# Patient Record
Sex: FEMALE | Race: OTHER | HISPANIC OR LATINO | URBAN - METROPOLITAN AREA
[De-identification: names, ages, dates, MRNs, and addresses within clinical notes are randomized per-mention and may not be internally consistent; named-entity substitution may affect disease eponyms.]

---

## 2020-12-23 ENCOUNTER — EMERGENCY (EMERGENCY)
Facility: HOSPITAL | Age: 20
LOS: 1 days | Discharge: HOME | End: 2020-12-23
Attending: EMERGENCY MEDICINE
Payer: MEDICAID

## 2020-12-23 VITALS
OXYGEN SATURATION: 97 % | SYSTOLIC BLOOD PRESSURE: 116 MMHG | HEIGHT: 65 IN | DIASTOLIC BLOOD PRESSURE: 70 MMHG | WEIGHT: 147.71 LBS | TEMPERATURE: 98 F | RESPIRATION RATE: 20 BRPM | HEART RATE: 108 BPM

## 2020-12-23 VITALS
TEMPERATURE: 98 F | OXYGEN SATURATION: 98 % | HEART RATE: 99 BPM | RESPIRATION RATE: 18 BRPM | DIASTOLIC BLOOD PRESSURE: 72 MMHG | SYSTOLIC BLOOD PRESSURE: 121 MMHG

## 2020-12-23 LAB
HCG UR QL: NEGATIVE — SIGNIFICANT CHANGE UP
RAPID RVP RESULT: SIGNIFICANT CHANGE UP
S PYO DNA THROAT QL NAA+PROBE: SIGNIFICANT CHANGE UP
SARS-COV-2 RNA SPEC QL NAA+PROBE: SIGNIFICANT CHANGE UP

## 2020-12-23 PROCEDURE — 87798 DETECT AGENT NOS DNA AMP: CPT

## 2020-12-23 PROCEDURE — 87651 STREP A DNA AMP PROBE: CPT

## 2020-12-23 PROCEDURE — 99283 EMERGENCY DEPT VISIT LOW MDM: CPT

## 2020-12-23 PROCEDURE — 99283 EMERGENCY DEPT VISIT LOW MDM: CPT | Mod: 25

## 2020-12-23 PROCEDURE — 0225U NFCT DS DNA&RNA 21 SARSCOV2: CPT

## 2020-12-23 PROCEDURE — 96372 THER/PROPH/DIAG INJ SC/IM: CPT | Mod: XU

## 2020-12-23 PROCEDURE — 81025 URINE PREGNANCY TEST: CPT

## 2020-12-23 RX ORDER — IBUPROFEN 200 MG
1 TABLET ORAL
Qty: 30 | Refills: 0
Start: 2020-12-23

## 2020-12-23 RX ORDER — PENICILLIN G BENZATHINE 1200000 [IU]/2ML
1.2 INJECTION, SUSPENSION INTRAMUSCULAR ONCE
Refills: 0 | Status: COMPLETED | OUTPATIENT
Start: 2020-12-23 | End: 2020-12-23

## 2020-12-23 RX ORDER — IBUPROFEN 200 MG
800 TABLET ORAL ONCE
Refills: 0 | Status: COMPLETED | OUTPATIENT
Start: 2020-12-23 | End: 2020-12-23

## 2020-12-23 RX ORDER — DEXAMETHASONE 0.5 MG/5ML
10 ELIXIR ORAL ONCE
Refills: 0 | Status: COMPLETED | OUTPATIENT
Start: 2020-12-23 | End: 2020-12-23

## 2020-12-23 RX ADMIN — Medication 10 MILLIGRAM(S): at 02:27

## 2020-12-23 RX ADMIN — Medication 800 MILLIGRAM(S): at 01:51

## 2020-12-23 RX ADMIN — Medication 800 MILLIGRAM(S): at 02:27

## 2020-12-23 RX ADMIN — PENICILLIN G BENZATHINE 1.2 MILLION UNIT(S): 1200000 INJECTION, SUSPENSION INTRAMUSCULAR at 05:15

## 2020-12-23 NOTE — ED ADULT NURSE NOTE - OBJECTIVE STATEMENT
pt compliant sore throat and fever that Sunday night, that is painful when swallow and it feels like 2 balls are stuck in her throat

## 2020-12-23 NOTE — ED PROVIDER NOTE - NSFOLLOWUPINSTRUCTIONS_ED_ALL_ED_FT
Strep Throat    WHAT YOU NEED TO KNOW:    Strep throat is a throat infection caused by bacteria. It is easily spread from person to person.    DISCHARGE INSTRUCTIONS:    Call 911 for any of the following:   •You have trouble breathing.          Return to the emergency department if:   •You have new symptoms like a bad headache, stiff neck, chest pain, or vomiting.      •You are drooling because you cannot swallow your spit.      Contact your healthcare provider if:   •You have a fever.      •You have a rash or ear pain.      •You have green, yellow-brown, or bloody mucus when you cough or blow your nose.      •You are unable to drink anything.      •You have questions or concerns about your condition or care.      Medicines:   •Antibiotics help treat your strep throat. You should feel better within 2 to 3 days after you start antibiotics.      •Take your medicine as directed. Contact your healthcare provider if you think your medicine is not helping or if you have side effects. Tell him or her if you are allergic to any medicine. Keep a list of the medicines, vitamins, and herbs you take. Include the amounts, and when and why you take them. Bring the list or the pill bottles to follow-up visits. Carry your medicine list with you in case of an emergency.      Manage your symptoms:   •Use lozenges, ice, soft foods, or popsicles to soothe your throat.      •Drink juice, milk shakes, or soup if your throat is too sore to eat solid food. Drinking liquids can also help prevent dehydration.      •Gargle with salt water. Mix ¼ teaspoon salt in a glass of warm water and gargle. This may help reduce swelling in your throat.      •Do not smoke. Nicotine and other chemicals in cigarettes and cigars can cause lung damage and make your symptoms worse. Ask your healthcare provider for information if you currently smoke and need help to quit. E-cigarettes or smokeless tobacco still contain nicotine. Talk to your healthcare provider before you use these products.       Return to work or school 24 hours after you start antibiotic medicine.    Prevent the spread of strep throat:   •Wash your hands often. Use soap and water. Wash your hands after you use the bathroom, change a child's diapers, or sneeze. Wash your hands before you prepare or eat food.       •Do not share food or drinks. Replace your toothbrush after you have taken antibiotics for 24 hours.      Follow up with your healthcare provider as directed: Write down your questions so you remember to ask them during your visits.        © Copyright [employer] 2020

## 2020-12-23 NOTE — ED PROVIDER NOTE - CLINICAL SUMMARY MEDICAL DECISION MAKING FREE TEXT BOX
Pt w/ aforementioned presentation w/ strep pharyngitis airway patent, no clinical signs of deep space throat infection or ludwigs, treated w/ bicillin, improved after meds, stable for dc home.

## 2020-12-23 NOTE — ED ADULT TRIAGE NOTE - CHIEF COMPLAINT QUOTE
pt compliant sore throat and fever that Sunday night, that is painful when swallow and it feels like 2 balls are stuck in her throat.

## 2020-12-23 NOTE — ED PROVIDER NOTE - OBJECTIVE STATEMENT
19 y/o female with no significant pmhx or pshx, comes in c/o sore throat x 2 days. Pt reports progressive sore throat with associated pain on swallowing and subjective fever. 19 y/o female with no significant pmhx or pshx, comes in c/o sore throat x 2 days. Pt reports progressive sore throat with associated pain on swallowing and subjective fever. Pt denies cough or dyspnea.

## 2020-12-23 NOTE — ED PROVIDER NOTE - PATIENT PORTAL LINK FT
You can access the FollowMyHealth Patient Portal offered by [facility] by registering at the following website: [email or web address]. By joining [employer]’s FollowMyHealth portal, you will also be able to view your health information using other applications (apps) compatible with our system.

## 2020-12-23 NOTE — ED PROVIDER NOTE - PHYSICAL EXAMINATION
General: pt lying in stretcher, appears stated age and is not in distress  HEENT: AT/NC, pink conjunctiva, anicteric sclerae, EOMI, PERRLA, TMs smooth, grey, intact, with normal landmarks, nasal mucosa pink, no discharge, turbinates not enlarged; moist mucus membranes, tongue well-papillated, good dentition; posterior pharynx shows no erythema or exudates;   Neck: supple, full ROM, trachea midline, no JVD, no cervical LAD, no midline ttp or stepoffs  Lungs: symmetric excursion, b/l clear vesicular breath sounds with no wheezes, crackles, or rhonchi  Heart: rrr, S1, S2 normal; no S3 or S4; no murmurs or rubs  Abd: normal bowel sounds; soft, nontender; negative McBurney's point tenderness, negative Tavares's sign, no rebound, no guarding, spleen non-palpable; no hepatomegaly, no masses  Back: no midline spinal tenderness or stepoffs, no costovertebral angle tenderness  Extremities: no clubbing, cyanosis, or edema; no palpable deformities or fractures  Skin: good turgor; no rashes, petechiae, ecchymoses, or jaundice  Pulses: radial, posterior tibialis (PT), dorsalis pedis (DP) all 2+ & symmetric  Neuro: awake, alert, responsive; oriented to person, place and time; cranial nerves intact, EOMI, intact jaw movement, intact facial sensation, no facial asymmetry, hearing intact; no nystagmus, tongue midline; Motor: Normal tone in upper and lower extremities bilaterally strength 5/5; Sensory: intact to pinprick and light touch; Cerebellar: finger-to-nose intact; normal steady gait; negative Romberg’s sign; DTR: biceps, triceps, patellar, 2+, no pronator drift General: pt lying in stretcher, appears stated age and is not in distress, speaking in full sentences  HEENT: AT/NC, pink conjunctiva, anicteric sclerae, EOMI, PERRLA, nasal mucosa pink, no discharge, turbinates not enlarged; moist mucus membranes, tongue well-papillated, good dentition; posterior pharynx shows 2+ tonsils, +exudates, uvula midline, +erythema  Neck: supple, full ROM, trachea midline, no JVD, +left cervical LAD, no midline ttp or stepoffs  Lungs: symmetric excursion, b/l clear vesicular breath sounds with no wheezes, crackles, or rhonchi  Heart: rrr, S1, S2 normal; no S3 or S4; no murmurs or rubs  Abd: normal bowel sounds; soft, nontender;   Extremities: no clubbing, cyanosis, or edema; no palpable deformities or fractures  Skin: good turgor; no rashes, petechiae, ecchymoses, or jaundice  Pulses: radial, posterior tibialis (PT), dorsalis pedis (DP) all 2+ & symmetric  Neuro: awake, alert, responsive; oriented to person, place and time; cranial nerves intact, EOMI, intact jaw movement, intact facial sensation, no facial asymmetry, hearing intact; no nystagmus, tongue midline; Motor: Normal tone in upper and lower extremities bilaterally ; Sensory: intact ; normal steady gait;

## 2020-12-23 NOTE — ED PROVIDER NOTE - NSFOLLOWUPCLINICS_GEN_ALL_ED_FT
[facility]  [address] [place] [address]  Phone: [phone]  Fax: [phone]  Follow Up Time:

## 2023-05-18 NOTE — ED ADULT TRIAGE NOTE - WEIGHT METHOD
soft, mild left upper quadrant tenderness to palpation, normal active bowel sounds, no guarding or rigidity
actual

## 2024-07-25 ENCOUNTER — APPOINTMENT (OUTPATIENT)
Dept: ORTHOPEDIC SURGERY | Facility: CLINIC | Age: 24
End: 2024-07-25
Payer: COMMERCIAL

## 2024-07-25 ENCOUNTER — RESULT CHARGE (OUTPATIENT)
Age: 24
End: 2024-07-25

## 2024-07-25 VITALS — BODY MASS INDEX: 24.49 KG/M2 | WEIGHT: 147 LBS | HEIGHT: 65 IN

## 2024-07-25 DIAGNOSIS — M65.9 SYNOVITIS AND TENOSYNOVITIS, UNSPECIFIED: ICD-10-CM

## 2024-07-25 DIAGNOSIS — S93.492A SPRAIN OF OTHER LIGAMENT OF LEFT ANKLE, INITIAL ENCOUNTER: ICD-10-CM

## 2024-07-25 DIAGNOSIS — Z78.9 OTHER SPECIFIED HEALTH STATUS: ICD-10-CM

## 2024-07-25 PROCEDURE — 73610 X-RAY EXAM OF ANKLE: CPT | Mod: LT

## 2024-07-25 PROCEDURE — 99204 OFFICE O/P NEW MOD 45 MIN: CPT

## 2024-07-25 RX ORDER — MELOXICAM 7.5 MG/1
7.5 TABLET ORAL
Qty: 30 | Refills: 0 | Status: ACTIVE | COMMUNITY
Start: 2024-07-25 | End: 1900-01-01

## 2024-07-25 NOTE — IMAGING
[de-identified] : Patient ambulates with a Antalgic gait   Left Foot and Ankle: Inspection: Erythema: None Swelling: Diffuse swelling laterally, anteriorly, medially Ecchymosis: None Abrasions: None Rashes: None Surgical Scars: None Effusion: None Atrophy: None Deformity: None Pes [place] Valgus: Negative Pes Cavus: Negative Hallux Valgus: Negative Clawtoe/Hammertoe: Negative  Palpation: Crepitus: None Proximal Fibula: Nontender Distal Fibula: Nontender Medial Malleolus: tender Lateral Malleolus: tender AITFL: tender PITFL: Nontender ATFL: tender CFL: tender Deltoid: tender Calcaneus: Nontender Talar Head/Neck: Nontender Lateral Process of Talus: Nontender Anterior Facet of Calcaneus: Nontender Peroneal Tendons: tender Posterior Tibialis: Nontender Achilles Tendon: Nontender & Intact Achilles Insertion: Nontender Retrocalcaneal Bursa: Nontender Anterior Capsule: tender Subtalar Joint: Nontender Talonavicular Joint: Nontender Calcaneocuboid Joint: Nontender Heel pad: Nontender Medial Tubercle of Calcaneus: Nontender Plantar Fascia: Nontender Midfoot: Nontender Forefoot: Nontender Sesamoids: Nontender  ROM: Ankle Dorsiflexion: 0 degrees Ankle Plantar Flexion: 35 degrees Eversion: 15 degrees Inversion: 25 degrees, painful  Motor: Dorsiflexion: 5 out of 5 Plantar Flexion: 5 out of 5 Inversion: 5  out of 5 Eversion: 5 out of 5, painful  Provocative Testing: Anterior Drawer: Positive Syndesmosis Squeeze Test: Negative Circumduction test: Negative Resisted ER: Painful  Axial Grind 1st MTP: Painless Neurologic Exam: L4-S1 Sensation: Grossly Intact Tinels: Negative  Vascular Exam/Pulses: Dorsalis Pedis: 2+ Posterior Tibialis: 2+ Capillary Refill: <2 Seconds Other Exams: None Pertinent Contralateral Findings: None

## 2024-07-25 NOTE — DISCUSSION/SUMMARY
[de-identified] : Discussed with patient the pathomechanics and pathophysiology of this condition.  Discussed with patient that I utilize a ladder analogy when determining treatment plans for ankle sprains.  Lowest rung on the ladder and easiest thing to do involves a trial of cam boot immobilization for 10 to 14 days, anti-inflammatories in the form of Mobic (a prescription was provided), weightbearing as tolerated, compression sock (information provided), and a plantar fascia night splint (information provided).  We discussed the risks and benefits associated with nonsteroidal anti-inflammatories.  Those risks include bleeding, kidney, GI issues.  Patient was instructed to take the medications with food.  And she was instructed to discontinue them should any abdominal or stomach discomfort occur.  I will see her back at 2 weeks time.  At that point we will reevaluate her degree of instability and initiate a functional rehabilitation protocol.  She will continue to maintain sleeping in the night splint for approximately 6 weeks in total.  Additionally she will begin on range of motion, edema control no passive or active inversion is to occur for at least 6 weeks.  Should her symptoms fail to improve or worsen by 6 weeks we will consider getting an advanced imaging in the form of MRI to ensure that we are not missing any other concurrent soft tissue pathology or chondral injuries that could be contributing to her symptomatology.  I am optimistic based on her history and physical examination that she will be able to heal this without requiring surgical fixation.  In the event that surgery is needed we discussed that appropriate surgical treatment would be based upon MRI findings, which would include either ligament repair versus reconstruction and potentially arthroscopic surgery, limited versus extensive debridement and addressing any chondral lesions that are present and/or any other associated pathology.  All questions were asked and answered.  Patient is in agreement with the plan.  She will follow-up accordingly.   The patient's current medication management of their orthopedic diagnosis was reviewed today: (1) We discussed a comprehensive treatment plan that included possible pharmaceutical management involving the use of prescription strength medications including but not limited to options such as oral Naprosyn 500mg BID, once daily Meloxicam 15 mg, or 500-650 mg Tylenol versus over the counter oral medications and topical prescription NSAID vs over the counter Voltaren gel. (2) There is a moderate risk of morbidity with further treatment, especially from use of prescription strength medications and possible side effects of these medications which include upset stomach with oral medications, skin reactions to topical medications and cardiac/renal issues with long term use. (3) I recommended that the patient follow-up with their medical physician to discuss any significant specific potential issues with long term medication use such as interactions with current medications or with exacerbation of underlying medical comorbidities. (4) The benefits and risks associated with use of injectable, oral or topical, prescription and over the counter anti-inflammatory medications were discussed with the patient. The patient voiced understanding of the risks including but not limited to bleeding, stroke, kidney dysfunction, heart disease, and were referred to the black box warning label for further information.     Plan for next visit: 1.  Assess ATFL CFL assess anterior drawer assess swelling, consider transition to ASO and PT with restriction of inversion active and passive for the next 4 weeks. 2. pt may want to see for shoulder in future

## 2024-08-16 ENCOUNTER — APPOINTMENT (OUTPATIENT)
Dept: ORTHOPEDIC SURGERY | Facility: CLINIC | Age: 24
End: 2024-08-16
Payer: COMMERCIAL

## 2024-08-16 DIAGNOSIS — S93.492A SPRAIN OF OTHER LIGAMENT OF LEFT ANKLE, INITIAL ENCOUNTER: ICD-10-CM

## 2024-08-16 DIAGNOSIS — M65.9 SYNOVITIS AND TENOSYNOVITIS, UNSPECIFIED: ICD-10-CM

## 2024-08-16 PROCEDURE — 99204 OFFICE O/P NEW MOD 45 MIN: CPT

## 2024-08-16 NOTE — DISCUSSION/SUMMARY
[de-identified] : Discussed with patient the pathomechanics and pathophysiology of this condition.  Discussed with patient that I utilize a ladder analogy when determining treatment plans for ankle sprains.  Lowest rung on the ladder and easiest thing to do involves a trial of cam boot immobilization for 10 to 14 days, anti-inflammatories in the form of Mobic (a prescription was provided), weightbearing as tolerated, compression sock (information provided), and a plantar fascia night splint (information provided).  We discussed the risks and benefits associated with nonsteroidal anti-inflammatories.  Those risks include bleeding, kidney, GI issues.  Patient was instructed to take the medications with food.  And she was instructed to discontinue them should any abdominal or stomach discomfort occur.  I will see her back at 2 weeks time.  At that point we will reevaluate her degree of instability and initiate a functional rehabilitation protocol.  She will continue to maintain sleeping in the night splint for approximately 6 weeks in total.  Additionally she will begin on range of motion, edema control no passive or active inversion is to occur for at least 6 weeks.  Should her symptoms fail to improve or worsen by 6 weeks we will consider getting an advanced imaging in the form of MRI to ensure that we are not missing any other concurrent soft tissue pathology or chondral injuries that could be contributing to her symptomatology.  I am optimistic based on her history and physical examination that she will be able to heal this without requiring surgical fixation.  In the event that surgery is needed we discussed that appropriate surgical treatment would be based upon MRI findings, which would include either ligament repair versus reconstruction and potentially arthroscopic surgery, limited versus extensive debridement and addressing any chondral lesions that are present and/or any other associated pathology.  All questions were asked and answered.  Patient is in agreement with the plan.  She will follow-up accordingly.   The patient's current medication management of their orthopedic diagnosis was reviewed today: (1) We discussed a comprehensive treatment plan that included possible pharmaceutical management involving the use of prescription strength medications including but not limited to options such as oral Naprosyn 500mg BID, once daily Meloxicam 15 mg, or 500-650 mg Tylenol versus over the counter oral medications and topical prescription NSAID vs over the counter Voltaren gel. (2) There is a moderate risk of morbidity with further treatment, especially from use of prescription strength medications and possible side effects of these medications which include upset stomach with oral medications, skin reactions to topical medications and cardiac/renal issues with long term use. (3) I recommended that the patient follow-up with their medical physician to discuss any significant specific potential issues with long term medication use such as interactions with current medications or with exacerbation of underlying medical comorbidities. (4) The benefits and risks associated with use of injectable, oral or topical, prescription and over the counter anti-inflammatory medications were discussed with the patient. The patient voiced understanding of the risks including but not limited to bleeding, stroke, kidney dysfunction, heart disease, and were referred to the black box warning label for further information.     Plan for next visit: 1.  Assess ATFL CFL assess anterior drawer assess swelling, consider transition to ASO and PT with restriction of inversion active and passive for the next 4 weeks.  *** Patient seen and examined.  Patient presents today following up for low ankle sprain, high ankle sprain, deltoid sprain.  they have been compliant with treatment protocol thus far including cam boot immobilization, plantar fascia night splint, compression socks, nonsteroidal anti-inflammatories.  On examination there is significant improvement in swelling and exam.  There is still a Lachman however it is improved compared to prior examination.  At this juncture I discussed with patient that they are able to progress to the next phase of recovery which involves transitioning out of the cam boot into an ASO with a compression sock. Plan to continue use the night splint for another 2.5 months, should they not be able to tolerate the night splint, they can sleep in the boot with a pillowcase around the boot. They are to be in the ASO at all times.  Additionally physical therapy has been ordered.  They have been given a printout of my functional ankle rehabilitation.  Additionally they have been educated on the restriction of no active or passive inversion for another 4 weeks.  I will plan to see them back in approximately 4 weeks time.  Should they continue to progress appropriately we will remove the inversion restriction and discuss return to sport activity and final stages of physical therapy.  Should there be any persistent pain or plateauing improvement we will obtain an MRI and discuss potential injection to help get them over the plateau.  Should patient go on to have persistent instability we will discuss risk and benefits of surgical intervention.  All questions asked and answered.  Patient will follow-up with me in 4 weeks.     Plan for next visit: 1.  Assess tenderness at ATFL CFL, deltoid, assess drawer, assess for subluxation of peroneal tendon, discuss advancing physical therapy and removing inversion restriction if improving.  If patient is not improving consider MR 2. pt may want to see for shoulder in future

## 2024-08-16 NOTE — HISTORY OF PRESENT ILLNESS
[de-identified] : 7/25/24: Date of Injury/Onset: 2021 Pain: At Rest: 2 With Activity: 7 Mechanism of injury:  This is NOT a Work Related Injury being treated under Worker's Compensation. This is NOT an athletic injury occurring associated with an interscholastic or organized sports team. Quality of symptoms: throbbing and aching pain  Improves with:  Worse with: walking, stairs and standing  Prior treatment: ankle brace  Prior Imaging: no Reports Available For Review Today: no Out of work/sport: working School/Sport/Position/Occupation: [employer]  07/25/2024 BOBBY 23 year F is here today for evaluation of left ankle. Patient reports injury 3 years ago she fell and sprained her ankle. Patient went to see her PCP wore a brace but never followed up. Patient denies n/t however endorses some throbbing and aching pain. Patient notes pain is radiating up her knee. Patient have not been taking pain meds. Patient notes pain is worse with walking, stairs and standing  8/16/24: Patient here to follow up on left ankle. Patient reports pain has improved, denies swelling. Wearing cam boot all day. Feels some discomfort and instability when taking boot off.

## 2024-08-16 NOTE — IMAGING
[de-identified] : Patient ambulates with a normal gait   Left Foot and Ankle: Inspection: Erythema: None Swelling: no swelling laterally, anteriorly, medially Ecchymosis: None Abrasions: None Rashes: None Surgical Scars: None Effusion: None Atrophy: None Deformity: None Pes [place] Valgus: Negative Pes Cavus: Negative Hallux Valgus: Negative Clawtoe/Hammertoe: Negative  Palpation: Crepitus: None Proximal Fibula: Nontender Distal Fibula: Nontender Medial Malleolus: nontender Lateral Malleolus: nontender AITFL: nontender PITFL: Nontender ATFL: nontender CFL: nontender Deltoid: tender Calcaneus: Nontender Talar Head/Neck: Nontender Lateral Process of Talus: Nontender Anterior Facet of Calcaneus: Nontender Peroneal Tendons: nontender Posterior Tibialis: Nontender Achilles Tendon: Nontender & Intact Achilles Insertion: Nontender Retrocalcaneal Bursa: Nontender Anterior Capsule: nontender Subtalar Joint: Nontender Talonavicular Joint: Nontender Calcaneocuboid Joint: Nontender Heel pad: Nontender Medial Tubercle of Calcaneus: Nontender Plantar Fascia: Nontender Midfoot: Nontender Forefoot: Nontender Sesamoids: Nontender  ROM: Ankle Dorsiflexion: 0 degrees Ankle Plantar Flexion: 35 degrees Eversion: 15 degrees Inversion: 25 degrees, painful  Motor: Dorsiflexion: 5 out of 5 Plantar Flexion: 5 out of 5 Inversion: 5  out of 5 Eversion: 5 out of 5, painless  Provocative Testing: Anterior Drawer: neg Syndesmosis Squeeze Test: Negative Circumduction test: Negative Resisted ER: Painless  Axial Grind 1st MTP: Painless Neurologic Exam: L4-S1 Sensation: Grossly Intact Tinels: Negative  Vascular Exam/Pulses: Dorsalis Pedis: 2+ Posterior Tibialis: 2+ Capillary Refill: <2 Seconds Other Exams: None Pertinent Contralateral Findings: None

## 2024-09-12 ENCOUNTER — APPOINTMENT (OUTPATIENT)
Dept: ORTHOPEDIC SURGERY | Facility: CLINIC | Age: 24
End: 2024-09-12
Payer: COMMERCIAL

## 2024-09-12 DIAGNOSIS — S93.492A SPRAIN OF OTHER LIGAMENT OF LEFT ANKLE, INITIAL ENCOUNTER: ICD-10-CM

## 2024-09-12 DIAGNOSIS — M65.9 SYNOVITIS AND TENOSYNOVITIS, UNSPECIFIED: ICD-10-CM

## 2024-09-12 DIAGNOSIS — Z00.00 ENCOUNTER FOR GENERAL ADULT MEDICAL EXAMINATION W/OUT ABNORMAL FINDINGS: ICD-10-CM

## 2024-09-12 DIAGNOSIS — G25.89 OTHER SPECIFIED EXTRAPYRAMIDAL AND MOVEMENT DISORDERS: ICD-10-CM

## 2024-09-12 PROCEDURE — 73030 X-RAY EXAM OF SHOULDER: CPT | Mod: LT

## 2024-09-12 PROCEDURE — 99214 OFFICE O/P EST MOD 30 MIN: CPT

## 2024-09-12 PROCEDURE — 73010 X-RAY EXAM OF SHOULDER BLADE: CPT | Mod: LT

## 2024-09-12 NOTE — IMAGING
[de-identified] : Patient ambulates with a normal gait   Left Foot and Ankle: Inspection: Erythema: None Swelling: no swelling laterally, anteriorly, medially Ecchymosis: None Abrasions: None Rashes: None Surgical Scars: None Effusion: None Atrophy: None Deformity: None Pes Tripoli Valgus: Negative Pes Cavus: Negative Hallux Valgus: Negative Clawtoe/Hammertoe: Negative  Palpation: Crepitus: None Proximal Fibula: Nontender Distal Fibula: Nontender Medial Malleolus: nontender Lateral Malleolus: nontender AITFL: nontender PITFL: Nontender ATFL: nontender CFL: nontender Deltoid: tender Calcaneus: Nontender Talar Head/Neck: Nontender Lateral Process of Talus: Nontender Anterior Facet of Calcaneus: Nontender Peroneal Tendons: nontender Posterior Tibialis: Nontender Achilles Tendon: Nontender & Intact Achilles Insertion: Nontender Retrocalcaneal Bursa: Nontender Anterior Capsule: nontender Subtalar Joint: Nontender Talonavicular Joint: Nontender Calcaneocuboid Joint: Nontender Heel pad: Nontender Medial Tubercle of Calcaneus: Nontender Plantar Fascia: Nontender Midfoot: Nontender Forefoot: Nontender Sesamoids: Nontender  ROM: Ankle Dorsiflexion: 0 degrees Ankle Plantar Flexion: 35 degrees Eversion: 15 degrees Inversion: 25 degrees, painful  Motor: Dorsiflexion: 5 out of 5 Plantar Flexion: 5 out of 5 Inversion: 5  out of 5 Eversion: 5 out of 5, painless  Provocative Testing: Anterior Drawer: neg Syndesmosis Squeeze Test: Negative Circumduction test: Negative Resisted ER: Painless  Axial Grind 1st MTP: Painless Neurologic Exam: L4-S1 Sensation: Grossly Intact Tinels: Negative  Vascular Exam/Pulses: Dorsalis Pedis: 2+ Posterior Tibialis: 2+ Capillary Refill: <2 Seconds Other Exams: None Pertinent Contralateral Findings: None   LEFT SHOULDER  Inspection: No swelling.   Palpation: Tenderness is noted at the bicipital groove, anterior and lateral.   Range of motion: There is pain with range of motion.  , ER 55, @90ER 90, @90IR 30  Strength: There is mild pain and discomfort with strength testing.  Forward Flexion 4/5. Abduction 4/5.  External Rotation 5-/5 and Internal Rotation 5/5   Neurological testings: motor and sensor intact distally.  Ligament Stability and Special Tests:   There is positive arc of pain.   Shoulder apprehension: neg  Shoulder relocation: neg  Obriens test: pos  Biceps Active test: neg  Nath Labral Shear: neg  Impingement testing: pos  Raul testing: pos  Whipple: pos  Cross Body Adduction: neg

## 2024-09-12 NOTE — DATA REVIEWED
[FreeTextEntry1] : 3 v l ankle  neg for fx or dislocation  Left X-Ray Examination of the SHOULDER 2 views:  no fractures, subluxations or dislocations.   X-Ray Examination of the SCAPULA 1 or 2 views shows: there is an acromial spur

## 2024-09-12 NOTE — DISCUSSION/SUMMARY
[de-identified] : Discussed with patient the pathomechanics and pathophysiology of this condition.  Discussed with patient that I utilize a ladder analogy when determining treatment plans for ankle sprains.  Lowest rung on the ladder and easiest thing to do involves a trial of cam boot immobilization for 10 to 14 days, anti-inflammatories in the form of Mobic (a prescription was provided), weightbearing as tolerated, compression sock (information provided), and a plantar fascia night splint (information provided).  We discussed the risks and benefits associated with nonsteroidal anti-inflammatories.  Those risks include bleeding, kidney, GI issues.  Patient was instructed to take the medications with food.  And she was instructed to discontinue them should any abdominal or stomach discomfort occur.  I will see her back at 2 weeks time.  At that point we will reevaluate her degree of instability and initiate a functional rehabilitation protocol.  She will continue to maintain sleeping in the night splint for approximately 6 weeks in total.  Additionally she will begin on range of motion, edema control no passive or active inversion is to occur for at least 6 weeks.  Should her symptoms fail to improve or worsen by 6 weeks we will consider getting an advanced imaging in the form of MRI to ensure that we are not missing any other concurrent soft tissue pathology or chondral injuries that could be contributing to her symptomatology.  I am optimistic based on her history and physical examination that she will be able to heal this without requiring surgical fixation.  In the event that surgery is needed we discussed that appropriate surgical treatment would be based upon MRI findings, which would include either ligament repair versus reconstruction and potentially arthroscopic surgery, limited versus extensive debridement and addressing any chondral lesions that are present and/or any other associated pathology.  All questions were asked and answered.  Patient is in agreement with the plan.  She will follow-up accordingly.   The patient's current medication management of their orthopedic diagnosis was reviewed today: (1) We discussed a comprehensive treatment plan that included possible pharmaceutical management involving the use of prescription strength medications including but not limited to options such as oral Naprosyn 500mg BID, once daily Meloxicam 15 mg, or 500-650 mg Tylenol versus over the counter oral medications and topical prescription NSAID vs over the counter Voltaren gel. (2) There is a moderate risk of morbidity with further treatment, especially from use of prescription strength medications and possible side effects of these medications which include upset stomach with oral medications, skin reactions to topical medications and cardiac/renal issues with long term use. (3) I recommended that the patient follow-up with their medical physician to discuss any significant specific potential issues with long term medication use such as interactions with current medications or with exacerbation of underlying medical comorbidities. (4) The benefits and risks associated with use of injectable, oral or topical, prescription and over the counter anti-inflammatory medications were discussed with the patient. The patient voiced understanding of the risks including but not limited to bleeding, stroke, kidney dysfunction, heart disease, and were referred to the black box warning label for further information.     Plan for next visit: 1.  Assess ATFL CFL assess anterior drawer assess swelling, consider transition to ASO and PT with restriction of inversion active and passive for the next 4 weeks.  *** Patient seen and examined.  Patient presents today following up for low ankle sprain, high ankle sprain, deltoid sprain.  they have been compliant with treatment protocol thus far including cam boot immobilization, plantar fascia night splint, compression socks, nonsteroidal anti-inflammatories.  On examination there is significant improvement in swelling and exam.  There is still a Lachman however it is improved compared to prior examination.  At this juncture I discussed with patient that they are able to progress to the next phase of recovery which involves transitioning out of the cam boot into an ASO with a compression sock. Plan to continue use the night splint for another 2.5 months, should they not be able to tolerate the night splint, they can sleep in the boot with a pillowcase around the boot. They are to be in the ASO at all times.  Additionally physical therapy has been ordered.  They have been given a printout of my functional ankle rehabilitation.  Additionally they have been educated on the restriction of no active or passive inversion for another 4 weeks.  I will plan to see them back in approximately 4 weeks time.  Should they continue to progress appropriately we will remove the inversion restriction and discuss return to sport activity and final stages of physical therapy.  Should there be any persistent pain or plateauing improvement we will obtain an MRI and discuss potential injection to help get them over the plateau.  Should patient go on to have persistent instability we will discuss risk and benefits of surgical intervention.  All questions asked and answered.  Patient will follow-up with me in 4 weeks.     Plan for next visit: 1.  Assess tenderness at ATFL CFL, deltoid, assess drawer, assess for subluxation of peroneal tendon, discuss advancing physical therapy and removing inversion restriction if improving.  If patient is not improving consider MR 2. pt may want to see for shoulder in future **** L ankle sprain improving with ASO and PT continue PT new PT rx provided gradual return to impact exercises L shoulder appears trapezial tenderness without any radicular signs with associated scapular dyskinesis ReC: HEP   next visit: RTS L ankle if improved, if not improved MR If ankle is okay, start PT for L shoulder

## 2024-09-12 NOTE — HISTORY OF PRESENT ILLNESS
[de-identified] : 7/25/24: Date of Injury/Onset: 2021 Pain: At Rest: 2 With Activity: 7 Mechanism of injury:  This is NOT a Work Related Injury being treated under Worker's Compensation. This is NOT an athletic injury occurring associated with an interscholastic or organized sports team. Quality of symptoms: throbbing and aching pain  Improves with:  Worse with: walking, stairs and standing  Prior treatment: ankle brace  Prior Imaging: no Reports Available For Review Today: no Out of work/sport: working School/Sport/Position/Occupation: [employer]  07/25/2024 BOBBY Sol[age] year F is here today for evaluation of left ankle. Patient reports injury 3 years ago she fell and sprained her ankle. Patient went to see her PCP wore a brace but never followed up. Patient denies n/t however endorses some throbbing and aching pain. Patient notes pain is radiating up her knee. Patient have not been taking pain meds. Patient notes pain is worse with walking, stairs and standing  8/16/24: Patient here to follow up on left ankle. Patient reports pain has improved, denies swelling. Wearing cam boot all day. Feels some discomfort and instability when taking boot off.   09/12/2024 BOBBY 23 year F is here today to follow up on left ankle. Patient reports some mild improvement with pain since last visit. Patient is doing PT, she had been compliant with ASO. Patient reports left shoulder soreness and stiffness pain for over 4 years ago, denies KRISTOFER.  patient notes pain radiates to left T-spine.